# Patient Record
Sex: FEMALE | Race: WHITE | HISPANIC OR LATINO | URBAN - METROPOLITAN AREA
[De-identification: names, ages, dates, MRNs, and addresses within clinical notes are randomized per-mention and may not be internally consistent; named-entity substitution may affect disease eponyms.]

---

## 2023-08-14 ENCOUNTER — EMERGENCY (EMERGENCY)
Facility: HOSPITAL | Age: 58
LOS: 1 days | Discharge: HOME | End: 2023-08-14
Attending: EMERGENCY MEDICINE
Payer: MEDICARE

## 2023-08-14 VITALS
HEART RATE: 72 BPM | DIASTOLIC BLOOD PRESSURE: 93 MMHG | SYSTOLIC BLOOD PRESSURE: 166 MMHG | RESPIRATION RATE: 16 BRPM | OXYGEN SATURATION: 97 % | TEMPERATURE: 98 F

## 2023-08-14 VITALS
HEIGHT: 64 IN | WEIGHT: 214.07 LBS | DIASTOLIC BLOOD PRESSURE: 92 MMHG | TEMPERATURE: 98 F | OXYGEN SATURATION: 97 % | RESPIRATION RATE: 18 BRPM | SYSTOLIC BLOOD PRESSURE: 145 MMHG | HEART RATE: 78 BPM

## 2023-08-14 PROCEDURE — 99283 EMERGENCY DEPT VISIT LOW MDM: CPT | Mod: 25

## 2023-08-14 PROCEDURE — 73562 X-RAY EXAM OF KNEE 3: CPT | Mod: 26,LT

## 2023-08-14 PROCEDURE — 99283 EMERGENCY DEPT VISIT LOW MDM: CPT

## 2023-08-14 PROCEDURE — 73562 X-RAY EXAM OF KNEE 3: CPT

## 2023-08-14 RX ORDER — ACETAMINOPHEN 500 MG
975 TABLET ORAL ONCE
Refills: 0 | Status: COMPLETED | OUTPATIENT
Start: 2023-08-14 | End: 2023-08-14

## 2023-08-14 RX ADMIN — Medication 975 MILLIGRAM(S): at 11:01

## 2023-08-14 RX ADMIN — Medication 975 MILLIGRAM(S): at 11:30

## 2023-08-14 NOTE — ED PROVIDER NOTE - CLINICAL SUMMARY MEDICAL DECISION MAKING FREE TEXT BOX
58-year-old female patient past medical history left knee arthroscopy for meniscus tear (1990), breast cancer, diabetes, CAD complains of lateral left knee pain after walking upstairs at [facility] [email or web address].  Denies fevers, chills, body aches, chest pain, shortness of breath, history of gout, recent trauma, recent travel, calf tenderness.  Patient took Advil yesterday.  Patient states she is able to ambulate with less pain with the knee immobilizer.Patient states she has been putting more weight on left knee since her right femur surgery from breast cancer. Pain exacerbated with flexion.    Will provide symptomatic treatment here in ED with Tylenol, left knee x-ray to rule out chip fracture.  Most likely muscle strain due to overactivity.  Low suspicion for DVT, gout, Baker's cyst, fracture.

## 2023-08-14 NOTE — ED PROVIDER NOTE - NS ED ATTENDING STATEMENT MOD
This was a shared visit with the JACINTO. I reviewed and verified the documentation and independently performed the documented:

## 2023-08-14 NOTE — ED PROVIDER NOTE - PATIENT PORTAL LINK FT
You can access the FollowMyHealth Patient Portal offered by [facility] by registering at the following website: [email or web address]. By joining [employer]’s FollowMyHealth portal, you will also be able to view your health information using other applications (apps) compatible with our system.

## 2023-08-14 NOTE — ED ADULT NURSE NOTE - NSFALLHARMRISKINTERV_ED_ALL_ED

## 2023-08-14 NOTE — ED ADULT NURSE REASSESSMENT NOTE - NS ED NURSE REASSESS COMMENT FT1
Report received from RUBEN Fitzpatrick. Pt A&O x3, VSS. Pt endorses right knee pain with relief of pain medication. Pt comfortably resting in bed locked in lowest position.

## 2023-08-14 NOTE — ED PROVIDER NOTE - PHYSICAL EXAMINATION
GENERAL: NAD  HEENT:  Atraumatic  CHEST/LUNG: Chest rise equal bilaterally  HEART: Regular rate and rhythm  EXTREMITIES:  Extremities warm  PSYCH: A&Ox3  SKIN: No obvious rashes or lesions  MSK: No cervical spine TTP, able to range neck to the left and right.   Right knee: Inspection: No swelling, no bruising, no redness, no streaking, no warmth, no deformity. Palpation: No bony tenderness. tenderness to lateral proximal knee. Special tests: (-)Anterior drawer test (-)posterior drawer test (-)  NEUROLOGY: strength and sensation intact in all extremities

## 2023-08-14 NOTE — ED PROVIDER NOTE - OBJECTIVE STATEMENT
58-year-old female patient past medical history left knee arthroscopy for meniscus tear (1990), breast cancer, diabetes, CAD complains of lateral left knee pain after walking upstairs at [facility].  Denies fevers, chills, body aches, chest pain, shortness of breath, history of gout, recent trauma.  Patient took Advil yesterday.  Patient states she is able to ambulate with less pain with the knee immobilizer.Patient states she has been putting more weight on left knee since her right femur surgery from breast cancer. Pain exacerbated with flexion.

## 2023-08-14 NOTE — ED PROVIDER NOTE - ATTENDING APP SHARED VISIT CONTRIBUTION OF CARE
RGUJRAL 58-year-old female history of metastatic breast cancer stage IV hypertension diabetes CAD left knee arthroscopy in 1990 presents with left knee pain since Saturday.  Patient states she was at [email or web address] and the escalators were not working and had to walk up many steps and felt pain during that night and then subsequently the next day.  Denies any trauma or fall fevers chills or any other joint pains.  Patient has applied ice to the knee immobilizer and has felt better.  On exam patient is well-appearing no acute distress superior to lateral knee with mild pain to palpation.  No swelling or erythema.  Positive range of motion with flexion extension.  2+ dorsalis pedis 5 out of 5 strength. Obtain x-ray for screening for fracture or mets, pain control. DDx muscle strain, Orthopedic follow-up and pain control.

## 2023-08-14 NOTE — ED ADULT NURSE NOTE - OBJECTIVE STATEMENT
59 yo female with pmh metastatic breast CA, HTN, CAD, L knee meniscus tear, R femur surgery presents to ED c/o atraumatic L knee pain x 2 days. Pt reports she does favor her left leg s/p R femur surgery. Pt reports difficulty bearing weight due to pain but has been better since she put on knee immobilizer. Pt denies calf pain, numbness/tingling, swelling 59 yo female with pmh metastatic breast CA, HTN, CAD, L knee meniscus tear, R femur surgery presents to ED c/o atraumatic L knee pain x 2 days. Pt reports she does favor her left leg s/p R femur surgery. Pt reports difficulty bearing weight due to pain but has been better since she put on knee immobilizer. Pt denies calf pain, numbness/tingling, swelling, redness, temp changes, fevers/chills. Pt a&ox3, breathing spontaneous and unlabored moving all extremities and following commands, skin warm dry and appropriate color, sensation intact. Pt safety measures in place and comfort provided.

## 2023-08-14 NOTE — ED ADULT NURSE NOTE - NSICDXPASTMEDICALHX_GEN_ALL_CORE_FT
PAST MEDICAL HISTORY:  CA - Breast Cancer     CAD (Coronary Artery Disease)     Depression     DM (Diabetes Mellitus)     HTN (Hypertension)     
none

## 2023-08-14 NOTE — ED PROVIDER NOTE - NSFOLLOWUPINSTRUCTIONS_ED_ALL_ED_FT
The results of imaging studies completed during your visit today were discussed and explained to you and a copy provided with your discharge instructions. Please follow up with an orthopedist within this week.    Muscle Strain  A muscle strain, or pulled muscle, happens when a muscle is stretched beyond its normal length. This can tear some muscle fibers and cause pain.    Usually, it takes 1–2 weeks to heal from a muscle strain. Full healing normally takes 5–6 weeks.    What are the causes?  This condition is caused when a sudden force is placed on a muscle and stretches it too far. This can happen with a fall, while lifting, or during sports.    What increases the risk?  You are more likely to develop a muscle strain if you are an athlete or you do a lot of physical activity.    What are the signs or symptoms?  Pain.  Tenderness.  Bruising.  Swelling.  Trouble using the muscle.  How is this treated?  This condition is first treated with PRICE therapy. This involves:  Protecting your muscle from being injured again.  Resting your injured muscle.  Icing your injured muscle.  Putting pressure (compression) on your injured muscle. This may be done with a splint or elastic bandage.  Raising (elevating) your injured muscle.  Your doctor may also recommend medicine for pain.    Follow these instructions at home:  If you have a splint that can be taken off:    Wear the splint as told by your doctor. Take it off only as told by your doctor.  Check the skin around the splint every day. Tell your doctor if you see problems.  Loosen the splint if your fingers or toes:  Tingle.  Become numb.  Turn cold and blue.  Keep the splint clean.  If the splint is not waterproof:  Do not let it get wet.  Cover it with a watertight covering when you take a bath or a shower.  Managing pain, stiffness, and swelling    Bag of ice on a towel on the skin.   If told, put ice on your injured area. To do this:  If you have a removable splint, take it off as told by your doctor.  Put ice in a plastic bag.  Place a towel between your skin and the bag.  Leave the ice on for 20 minutes, 2–3 times a day.  Take off the ice if your skin turns bright red. This is very important. If you cannot feel pain, heat, or cold, you have a greater risk of damage to the area.  Move your fingers or toes often.  Raise the injured area above the level of your heart while you are sitting or lying down.  Wear an elastic bandage as told by your doctor. Make sure it is not too tight.  General instructions    Take over-the-counter and prescription medicines only as told by your doctor. This may include:  Medicines for pain and swelling that are taken by mouth or put on the skin.  Medicines to help relax your muscles.  Limit your activity. Rest your injured muscle as told by your doctor. Your doctor may say that gentle movements are okay.  If physical therapy was prescribed, do exercises as told by your doctor.  Do not put pressure on any part of the splint until it is fully hardened. This may take many hours.  Do not smoke or use any products that contain nicotine or tobacco. If you need help quitting, ask your doctor.  Ask your doctor when it is safe to drive if you have a splint.  Keep all follow-up visits.  How is this prevented?  Warm up before you exercise. This helps to prevent more muscle strains.    Contact a doctor if:  You have more pain or swelling in the injured area.  Get help right away if:  You have any of these problems in your injured area:  Numbness.  Tingling.  Less strength than normal.    Summary  A muscle strain is an injury that happens when a muscle is stretched beyond normal length.  This condition is first treated with PRICE therapy. This includes protecting, resting, icing, adding pressure, and raising your injury.  Limit your activity. Rest your injured muscle as told by your doctor. Your doctor may say that gentle movements are okay.  Warm up before you exercise. This helps to prevent more muscle strains.  This information is not intended to replace advice given to you by your health care provider. Make sure you discuss any questions you have with your health care provider. The results of imaging studies completed during your visit today were discussed and explained to you and a copy provided with your discharge instructions. Please follow up with an orthopedist within this week.    Arthritis    WHAT YOU NEED TO KNOW:    Arthritis is pain or disease in one or more joints. There are many types of arthritis. Types such as rheumatoid arthritis cause inflammation in the joints. Other types wear away the cartilage between joints, such as osteoarthritis. This makes the bones of the joint rub together when you move the joint. An infection from bacteria, a virus, or a fungus can also cause arthritis. Your symptoms may be constant, or symptoms may come and go. Arthritis often gets worse over time and can cause permanent joint damage.    DISCHARGE INSTRUCTIONS:    Call your doctor or rheumatologist if:    You have a fever and severe joint pain or swelling.    You cannot move the affected joint.    You have severe joint pain you cannot tolerate.    You have a new or worsening rash.    Your pain or swelling does not get better with treatment.    You have questions or concerns about your condition or care.  Medicines:    Acetaminophen decreases pain and fever. It is available without a doctor's order. Ask how much to take and how often to take it. Follow directions. Read the labels of all other medicines you are using to see if they also contain acetaminophen, or ask your doctor or pharmacist. Acetaminophen can cause liver damage if not taken correctly.    NSAIDs, such as ibuprofen, help decrease swelling, pain, and fever. This medicine is available with or without a doctor's order. NSAIDs can cause stomach bleeding or kidney problems in certain people. If you take blood thinner medicine, always ask your healthcare provider if NSAIDs are safe for you. Always read the medicine label and follow directions.    Steroids reduce swelling and pain.    Prescription pain medicine may be given. Ask your healthcare provider how to take this medicine safely. Some prescription pain medicines contain acetaminophen. Do not take other medicines that contain acetaminophen without talking to your healthcare provider. Too much acetaminophen may cause liver damage. Prescription pain medicine may cause constipation. Ask your healthcare provider how to prevent or treat constipation.    Take your medicine as directed. Contact your healthcare provider if you think your medicine is not helping or if you have side effects. Tell your provider if you are allergic to any medicine. Keep a list of the medicines, vitamins, and herbs you take. Include the amounts, and when and why you take them. Bring the list or the pill bottles to follow-up visits. Carry your medicine list with you in case of an emergency.  Manage your symptoms:    Rest your painful joint so it can heal. Your healthcare provider may recommend crutches or a walker if the affected joint is in a leg.    Apply ice or heat to the joint. Both can help decrease swelling and pain. Ice may also help prevent tissue damage. Use an ice pack, or put crushed ice in a plastic bag. Cover it with a towel and place it on your joint for 15 to 20 minutes every hour or as directed. You can apply heat for 20 minutes every 2 hours. Heat treatment includes hot packs or heat lamps.    Elevate your joint. Elevation helps reduce swelling and pain. Raise your joint above the level of your heart as often as you can. Prop your painful joint on pillows to keep it above your heart comfortably.  Elevate Leg  Manage arthritis:    Talk to your healthcare providers about your arthritis medicines. Some medicines may only be needed when you have arthritis pain. You may need to take other medicines every day to prevent arthritis from getting worse. Your healthcare providers will help you understand all your medicines and when to take them. It is important to take the medicines as directed, even if you start to feel better. You can continue to have joint damage and inflammation even if you do not feel it.    Eat a variety of healthy foods. Healthy foods include fruits, vegetables, whole-grain breads, low-fat dairy products, beans, lean meats, and fish. Ask if you need to be on a special diet. A diet rich in calcium and vitamin D may decrease your risk of osteoporosis. Foods high in calcium include milk, cheese, broccoli, and tofu. Vitamin D may be found in meat, fish, fortified milk, cereal and bread. Ask if you need calcium or vitamin D supplements.    Healthy Foods      Go to physical or occupational therapy as directed. A physical therapist can teach you exercises to improve flexibility and range of motion. You may also be shown non-weight-bearing exercises that are safe for your joints, such as swimming. Exercise can help keep your joints flexible and reduce pain. An occupational therapist can help you learn to do your daily activities when your joints are stiff or sore.    Maintain a healthy weight. Extra weight puts increased pressure on your joints. Ask your healthcare provider what you should weigh. If you need to lose weight, he or she can help you create a weight loss program. Weight loss can help reduce pain and increase your ability to do your activities.    Wear flat or low-heeled shoes. This will help decrease pain and reduce pressure on your ankle, knee, and hip joints.    Do not smoke. Nicotine and other chemicals in cigarettes and cigars can damage your bones and joints. Ask your healthcare provider for information if you currently smoke and need help to quit. E-cigarettes or smokeless tobacco still contain nicotine. Talk to your healthcare provider before you use these products.  Support devices:    Orthotic shoes or insoles help support your feet when you walk.    Crutches, a cane, or a walker may help decrease your risk for falling. They also decrease stress on affected joints.    Devices to prevent falls include raised toilet seats and bathtub bars to help you get up from sitting. Handrails can be placed in areas where you need balance and support.  Fall Prevention for Adults      Devices to help with support and rest include splints to wear on your hands and a firm pillow while you sleep. Use a pillow that is firm enough to support your neck and head.  Follow up with your healthcare provider or rheumatologist as directed: Write down your questions so you remember to ask them during your visits.    Muscle Strain  A muscle strain, or pulled muscle, happens when a muscle is stretched beyond its normal length. This can tear some muscle fibers and cause pain.    Usually, it takes 1–2 weeks to heal from a muscle strain. Full healing normally takes 5–6 weeks.    What are the causes?  This condition is caused when a sudden force is placed on a muscle and stretches it too far. This can happen with a fall, while lifting, or during sports.    What increases the risk?  You are more likely to develop a muscle strain if you are an athlete or you do a lot of physical activity.    What are the signs or symptoms?  Pain.  Tenderness.  Bruising.  Swelling.  Trouble using the muscle.  How is this treated?  This condition is first treated with PRICE therapy. This involves:  Protecting your muscle from being injured again.  Resting your injured muscle.  Icing your injured muscle.  Putting pressure (compression) on your injured muscle. This may be done with a splint or elastic bandage.  Raising (elevating) your injured muscle.  Your doctor may also recommend medicine for pain.    Follow these instructions at home:  If you have a splint that can be taken off:    Wear the splint as told by your doctor. Take it off only as told by your doctor.  Check the skin around the splint every day. Tell your doctor if you see problems.  Loosen the splint if your fingers or toes:  Tingle.  Become numb.  Turn cold and blue.  Keep the splint clean.  If the splint is not waterproof:  Do not let it get wet.  Cover it with a watertight covering when you take a bath or a shower.  Managing pain, stiffness, and swelling    Bag of ice on a towel on the skin.   If told, put ice on your injured area. To do this:  If you have a removable splint, take it off as told by your doctor.  Put ice in a plastic bag.  Place a towel between your skin and the bag.  Leave the ice on for 20 minutes, 2–3 times a day.  Take off the ice if your skin turns bright red. This is very important. If you cannot feel pain, heat, or cold, you have a greater risk of damage to the area.  Move your fingers or toes often.  Raise the injured area above the level of your heart while you are sitting or lying down.  Wear an elastic bandage as told by your doctor. Make sure it is not too tight.  General instructions    Take over-the-counter and prescription medicines only as told by your doctor. This may include:  Medicines for pain and swelling that are taken by mouth or put on the skin.  Medicines to help relax your muscles.  Limit your activity. Rest your injured muscle as told by your doctor. Your doctor may say that gentle movements are okay.  If physical therapy was prescribed, do exercises as told by your doctor.  Do not put pressure on any part of the splint until it is fully hardened. This may take many hours.  Do not smoke or use any products that contain nicotine or tobacco. If you need help quitting, ask your doctor.  Ask your doctor when it is safe to drive if you have a splint.  Keep all follow-up visits.  How is this prevented?  Warm up before you exercise. This helps to prevent more muscle strains.    Contact a doctor if:  You have more pain or swelling in the injured area.  Get help right away if:  You have any of these problems in your injured area:  Numbness.  Tingling.  Less strength than normal.    Summary  A muscle strain is an injury that happens when a muscle is stretched beyond normal length.  This condition is first treated with PRICE therapy. This includes protecting, resting, icing, adding pressure, and raising your injury.  Limit your activity. Rest your injured muscle as told by your doctor. Your doctor may say that gentle movements are okay.  Warm up before you exercise. This helps to prevent more muscle strains.  This information is not intended to replace advice given to you by your health care provider. Make sure you discuss any questions you have with your health care provider. The results of imaging studies completed during your visit today were discussed and explained to you and a copy provided with your discharge instructions. Please follow up with an orthopedist within this week.    Orthopedist:   Nirmal Baez  [phone]    Arthritis    WHAT YOU NEED TO KNOW:    Arthritis is pain or disease in one or more joints. There are many types of arthritis. Types such as rheumatoid arthritis cause inflammation in the joints. Other types wear away the cartilage between joints, such as osteoarthritis. This makes the bones of the joint rub together when you move the joint. An infection from bacteria, a virus, or a fungus can also cause arthritis. Your symptoms may be constant, or symptoms may come and go. Arthritis often gets worse over time and can cause permanent joint damage.    DISCHARGE INSTRUCTIONS:    Call your doctor or rheumatologist if:    You have a fever and severe joint pain or swelling.    You cannot move the affected joint.    You have severe joint pain you cannot tolerate.    You have a new or worsening rash.    Your pain or swelling does not get better with treatment.    You have questions or concerns about your condition or care.  Medicines:    Acetaminophen decreases pain and fever. It is available without a doctor's order. Ask how much to take and how often to take it. Follow directions. Read the labels of all other medicines you are using to see if they also contain acetaminophen, or ask your doctor or pharmacist. Acetaminophen can cause liver damage if not taken correctly.    NSAIDs, such as ibuprofen, help decrease swelling, pain, and fever. This medicine is available with or without a doctor's order. NSAIDs can cause stomach bleeding or kidney problems in certain people. If you take blood thinner medicine, always ask your healthcare provider if NSAIDs are safe for you. Always read the medicine label and follow directions.    Steroids reduce swelling and pain.    Prescription pain medicine may be given. Ask your healthcare provider how to take this medicine safely. Some prescription pain medicines contain acetaminophen. Do not take other medicines that contain acetaminophen without talking to your healthcare provider. Too much acetaminophen may cause liver damage. Prescription pain medicine may cause constipation. Ask your healthcare provider how to prevent or treat constipation.    Take your medicine as directed. Contact your healthcare provider if you think your medicine is not helping or if you have side effects. Tell your provider if you are allergic to any medicine. Keep a list of the medicines, vitamins, and herbs you take. Include the amounts, and when and why you take them. Bring the list or the pill bottles to follow-up visits. Carry your medicine list with you in case of an emergency.  Manage your symptoms:    Rest your painful joint so it can heal. Your healthcare provider may recommend crutches or a walker if the affected joint is in a leg.    Apply ice or heat to the joint. Both can help decrease swelling and pain. Ice may also help prevent tissue damage. Use an ice pack, or put crushed ice in a plastic bag. Cover it with a towel and place it on your joint for 15 to 20 minutes every hour or as directed. You can apply heat for 20 minutes every 2 hours. Heat treatment includes hot packs or heat lamps.    Elevate your joint. Elevation helps reduce swelling and pain. Raise your joint above the level of your heart as often as you can. Prop your painful joint on pillows to keep it above your heart comfortably.  Elevate Leg  Manage arthritis:    Talk to your healthcare providers about your arthritis medicines. Some medicines may only be needed when you have arthritis pain. You may need to take other medicines every day to prevent arthritis from getting worse. Your healthcare providers will help you understand all your medicines and when to take them. It is important to take the medicines as directed, even if you start to feel better. You can continue to have joint damage and inflammation even if you do not feel it.    Eat a variety of healthy foods. Healthy foods include fruits, vegetables, whole-grain breads, low-fat dairy products, beans, lean meats, and fish. Ask if you need to be on a special diet. A diet rich in calcium and vitamin D may decrease your risk of osteoporosis. Foods high in calcium include milk, cheese, broccoli, and tofu. Vitamin D may be found in meat, fish, fortified milk, cereal and bread. Ask if you need calcium or vitamin D supplements.    Healthy Foods      Go to physical or occupational therapy as directed. A physical therapist can teach you exercises to improve flexibility and range of motion. You may also be shown non-weight-bearing exercises that are safe for your joints, such as swimming. Exercise can help keep your joints flexible and reduce pain. An occupational therapist can help you learn to do your daily activities when your joints are stiff or sore.    Maintain a healthy weight. Extra weight puts increased pressure on your joints. Ask your healthcare provider what you should weigh. If you need to lose weight, he or she can help you create a weight loss program. Weight loss can help reduce pain and increase your ability to do your activities.    Wear flat or low-heeled shoes. This will help decrease pain and reduce pressure on your ankle, knee, and hip joints.    Do not smoke. Nicotine and other chemicals in cigarettes and cigars can damage your bones and joints. Ask your healthcare provider for information if you currently smoke and need help to quit. E-cigarettes or smokeless tobacco still contain nicotine. Talk to your healthcare provider before you use these products.  Support devices:    Orthotic shoes or insoles help support your feet when you walk.    Crutches, a cane, or a walker may help decrease your risk for falling. They also decrease stress on affected joints.    Devices to prevent falls include raised toilet seats and bathtub bars to help you get up from sitting. Handrails can be placed in areas where you need balance and support.  Fall Prevention for Adults      Devices to help with support and rest include splints to wear on your hands and a firm pillow while you sleep. Use a pillow that is firm enough to support your neck and head.  Follow up with your healthcare provider or rheumatologist as directed: Write down your questions so you remember to ask them during your visits.    Muscle Strain  A muscle strain, or pulled muscle, happens when a muscle is stretched beyond its normal length. This can tear some muscle fibers and cause pain.    Usually, it takes 1–2 weeks to heal from a muscle strain. Full healing normally takes 5–6 weeks.    What are the causes?  This condition is caused when a sudden force is placed on a muscle and stretches it too far. This can happen with a fall, while lifting, or during sports.    What increases the risk?  You are more likely to develop a muscle strain if you are an athlete or you do a lot of physical activity.    What are the signs or symptoms?  Pain.  Tenderness.  Bruising.  Swelling.  Trouble using the muscle.  How is this treated?  This condition is first treated with PRICE therapy. This involves:  Protecting your muscle from being injured again.  Resting your injured muscle.  Icing your injured muscle.  Putting pressure (compression) on your injured muscle. This may be done with a splint or elastic bandage.  Raising (elevating) your injured muscle.  Your doctor may also recommend medicine for pain.    Follow these instructions at home:  If you have a splint that can be taken off:    Wear the splint as told by your doctor. Take it off only as told by your doctor.  Check the skin around the splint every day. Tell your doctor if you see problems.  Loosen the splint if your fingers or toes:  Tingle.  Become numb.  Turn cold and blue.  Keep the splint clean.  If the splint is not waterproof:  Do not let it get wet.  Cover it with a watertight covering when you take a bath or a shower.  Managing pain, stiffness, and swelling    Bag of ice on a towel on the skin.   If told, put ice on your injured area. To do this:  If you have a removable splint, take it off as told by your doctor.  Put ice in a plastic bag.  Place a towel between your skin and the bag.  Leave the ice on for 20 minutes, 2–3 times a day.  Take off the ice if your skin turns bright red. This is very important. If you cannot feel pain, heat, or cold, you have a greater risk of damage to the area.  Move your fingers or toes often.  Raise the injured area above the level of your heart while you are sitting or lying down.  Wear an elastic bandage as told by your doctor. Make sure it is not too tight.  General instructions    Take over-the-counter and prescription medicines only as told by your doctor. This may include:  Medicines for pain and swelling that are taken by mouth or put on the skin.  Medicines to help relax your muscles.  Limit your activity. Rest your injured muscle as told by your doctor. Your doctor may say that gentle movements are okay.  If physical therapy was prescribed, do exercises as told by your doctor.  Do not put pressure on any part of the splint until it is fully hardened. This may take many hours.  Do not smoke or use any products that contain nicotine or tobacco. If you need help quitting, ask your doctor.  Ask your doctor when it is safe to drive if you have a splint.  Keep all follow-up visits.  How is this prevented?  Warm up before you exercise. This helps to prevent more muscle strains.    Contact a doctor if:  You have more pain or swelling in the injured area.  Get help right away if:  You have any of these problems in your injured area:  Numbness.  Tingling.  Less strength than normal.    Summary  A muscle strain is an injury that happens when a muscle is stretched beyond normal length.  This condition is first treated with PRICE therapy. This includes protecting, resting, icing, adding pressure, and raising your injury.  Limit your activity. Rest your injured muscle as told by your doctor. Your doctor may say that gentle movements are okay.  Warm up before you exercise. This helps to prevent more muscle strains.  This information is not intended to replace advice given to you by your health care provider. Make sure you discuss any questions you have with your health care provider.

## 2023-08-14 NOTE — ED PROVIDER NOTE - NSICDXPASTMEDICALHX_GEN_ALL_CORE_FT
PAST MEDICAL HISTORY:  [place] - Breast Cancer     CAD (Coronary Artery Disease)     Depression     DM (Diabetes Mellitus)     HTN (Hypertension)

## 2024-08-20 ENCOUNTER — EMERGENCY (EMERGENCY)
Facility: HOSPITAL | Age: 59
LOS: 1 days | Discharge: LEFT BEFORE DISCHARGE | End: 2024-08-20
Admitting: EMERGENCY MEDICINE
Payer: MEDICARE

## 2024-08-20 VITALS
HEART RATE: 111 BPM | DIASTOLIC BLOOD PRESSURE: 113 MMHG | RESPIRATION RATE: 20 BRPM | OXYGEN SATURATION: 96 % | WEIGHT: 214.07 LBS | HEIGHT: 64 IN | SYSTOLIC BLOOD PRESSURE: 150 MMHG | TEMPERATURE: 98 F

## 2024-08-20 LAB
ALBUMIN SERPL ELPH-MCNC: 4.3 G/DL — SIGNIFICANT CHANGE UP (ref 3.3–5)
ALP SERPL-CCNC: 84 U/L — SIGNIFICANT CHANGE UP (ref 40–120)
ALT FLD-CCNC: 12 U/L — SIGNIFICANT CHANGE UP (ref 10–45)
ANION GAP SERPL CALC-SCNC: 17 MMOL/L — SIGNIFICANT CHANGE UP (ref 5–17)
AST SERPL-CCNC: 14 U/L — SIGNIFICANT CHANGE UP (ref 10–40)
BASOPHILS # BLD AUTO: 0.07 K/UL — SIGNIFICANT CHANGE UP (ref 0–0.2)
BASOPHILS NFR BLD AUTO: 0.8 % — SIGNIFICANT CHANGE UP (ref 0–2)
BILIRUB SERPL-MCNC: 0.6 MG/DL — SIGNIFICANT CHANGE UP (ref 0.2–1.2)
BUN SERPL-MCNC: 12 MG/DL — SIGNIFICANT CHANGE UP (ref 7–23)
CALCIUM SERPL-MCNC: 9.7 MG/DL — SIGNIFICANT CHANGE UP (ref 8.4–10.5)
CHLORIDE SERPL-SCNC: 102 MMOL/L — SIGNIFICANT CHANGE UP (ref 96–108)
CO2 SERPL-SCNC: 20 MMOL/L — LOW (ref 22–31)
CREAT SERPL-MCNC: 0.49 MG/DL — LOW (ref 0.5–1.3)
EGFR: 108 ML/MIN/1.73M2 — SIGNIFICANT CHANGE UP
EOSINOPHIL # BLD AUTO: 0.12 K/UL — SIGNIFICANT CHANGE UP (ref 0–0.5)
EOSINOPHIL NFR BLD AUTO: 1.3 % — SIGNIFICANT CHANGE UP (ref 0–6)
GLUCOSE SERPL-MCNC: 140 MG/DL — HIGH (ref 70–99)
HCT VFR BLD CALC: 40.8 % — SIGNIFICANT CHANGE UP (ref 34.5–45)
HGB BLD-MCNC: 13.4 G/DL — SIGNIFICANT CHANGE UP (ref 11.5–15.5)
IMM GRANULOCYTES NFR BLD AUTO: 0.4 % — SIGNIFICANT CHANGE UP (ref 0–0.9)
LYMPHOCYTES # BLD AUTO: 2.14 K/UL — SIGNIFICANT CHANGE UP (ref 1–3.3)
LYMPHOCYTES # BLD AUTO: 23.4 % — SIGNIFICANT CHANGE UP (ref 13–44)
MCHC RBC-ENTMCNC: 28.2 PG — SIGNIFICANT CHANGE UP (ref 27–34)
MCHC RBC-ENTMCNC: 32.8 GM/DL — SIGNIFICANT CHANGE UP (ref 32–36)
MCV RBC AUTO: 85.7 FL — SIGNIFICANT CHANGE UP (ref 80–100)
MONOCYTES # BLD AUTO: 0.58 K/UL — SIGNIFICANT CHANGE UP (ref 0–0.9)
MONOCYTES NFR BLD AUTO: 6.4 % — SIGNIFICANT CHANGE UP (ref 2–14)
NEUTROPHILS # BLD AUTO: 6.18 K/UL — SIGNIFICANT CHANGE UP (ref 1.8–7.4)
NEUTROPHILS NFR BLD AUTO: 67.7 % — SIGNIFICANT CHANGE UP (ref 43–77)
NRBC # BLD: 0 /100 WBCS — SIGNIFICANT CHANGE UP (ref 0–0)
PLATELET # BLD AUTO: 281 K/UL — SIGNIFICANT CHANGE UP (ref 150–400)
POTASSIUM SERPL-MCNC: 3.5 MMOL/L — SIGNIFICANT CHANGE UP (ref 3.5–5.3)
POTASSIUM SERPL-SCNC: 3.5 MMOL/L — SIGNIFICANT CHANGE UP (ref 3.5–5.3)
PROT SERPL-MCNC: 7.9 G/DL — SIGNIFICANT CHANGE UP (ref 6–8.3)
RBC # BLD: 4.76 M/UL — SIGNIFICANT CHANGE UP (ref 3.8–5.2)
RBC # FLD: 14.6 % — HIGH (ref 10.3–14.5)
SODIUM SERPL-SCNC: 139 MMOL/L — SIGNIFICANT CHANGE UP (ref 135–145)
WBC # BLD: 9.13 K/UL — SIGNIFICANT CHANGE UP (ref 3.8–10.5)
WBC # FLD AUTO: 9.13 K/UL — SIGNIFICANT CHANGE UP (ref 3.8–10.5)

## 2024-08-20 PROCEDURE — L9991: CPT

## 2024-08-20 NOTE — ED PROVIDER NOTE - RAPID ASSESSMENT
59-year-old female history of breast cancer presenting to the emergency department with intermittent fluttering in her left ear first noticed today.  No headaches no visual changes no fevers no chills.  No sinus congestion no sore throat.  Patient became concerned when she went on [employer] to look up her symptoms which said that it could be something related to the ear possibly the vasculature in her brain prompting her to choose the emergency department today.  Patient was rapidly assessed via a telemedicine and/or role of Quick Triage Doctor; a limited history, physical exam and assessment was performed. The patient will be seen and further evaluated in the main emergency department. The remainder of care and evaluation will be conducted by the primary emergency medicine team. Receiving team will follow up on labs, imaging and serially reassess patient as indicated. All further decisions regarding patient care, evaluation and disposition are at the discretion of the receiving primary emergency department team.